# Patient Record
Sex: FEMALE | URBAN - METROPOLITAN AREA
[De-identification: names, ages, dates, MRNs, and addresses within clinical notes are randomized per-mention and may not be internally consistent; named-entity substitution may affect disease eponyms.]

---
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
## 2023-08-20 ENCOUNTER — ATHLETIC TRAINING (OUTPATIENT)
Dept: SPORTS MEDICINE | Facility: OTHER | Age: 18
End: 2023-08-20

## 2023-08-20 DIAGNOSIS — S93.622D LISFRANC'S SPRAIN, LEFT, SUBSEQUENT ENCOUNTER: Primary | ICD-10-CM

## 2023-08-27 ENCOUNTER — ATHLETIC TRAINING (OUTPATIENT)
Dept: SPORTS MEDICINE | Facility: OTHER | Age: 18
End: 2023-08-27

## 2023-08-27 DIAGNOSIS — S76.211A STRAIN OF GROIN, RIGHT, INITIAL ENCOUNTER: Primary | ICD-10-CM

## 2023-08-28 ENCOUNTER — ATHLETIC TRAINING (OUTPATIENT)
Dept: SPORTS MEDICINE | Facility: OTHER | Age: 18
End: 2023-08-28

## 2023-08-28 DIAGNOSIS — S76.211D STRAIN OF GROIN, RIGHT, SUBSEQUENT ENCOUNTER: Primary | ICD-10-CM

## 2023-08-30 ENCOUNTER — ATHLETIC TRAINING (OUTPATIENT)
Dept: SPORTS MEDICINE | Facility: OTHER | Age: 18
End: 2023-08-30

## 2023-08-30 DIAGNOSIS — S76.211D STRAIN OF GROIN, RIGHT, SUBSEQUENT ENCOUNTER: Primary | ICD-10-CM

## 2023-08-30 NOTE — PROGRESS NOTES
Athletic Training Progress Note    Name: Pino Larios  Age: 19 y.o. Assessment/Plan:     Visit Diagnosis: Strain of groin, right, subsequent encounter [N18.837O]    Treatment Plan: Decrease Pain, Strengthen    []  Follow-up PRN. []  Follow-up prior to next practice/game for re-evaluation. [x]  Daily treatment/rehab. Progress note expected weekly. 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 Pt stated their hip last night was very sore from the exercises they completed yesterday. Pt overall appears in good spirits. Pt wants to work on this discomfort before it gets worse. Objective:   See treatment log below    Treatment Log:     Date: 8/28/23 8/27/23   Playing Status: Modified  Modified          Exercise/Treatment       MHP 10min 10min    Fire hydrants  2x10    clamshells 2x10 2x10    normatec  10min    calf stretch 3x20s      assisted (band) hamstring stretch 3x20s      SLR (4way) 2# 3x10      clamshells 3x10      lateral lunges (bilateral) 3x10      Normatec 15min             Pt will schedule for appts this week, to be ready for their first game on Friday.   CY LAT ATC

## 2023-08-30 NOTE — PROGRESS NOTES
Athletic Training Hip/Thigh Evaluation     Name: Cathy Lua  Age: 19 y.o.   School District: [employer]   Sport: Women's Soccer  Date of Assessment: 8/27/2023     Assessment/Plan:      Visit Diagnosis: Strain of groin, right, initial encounter [P95.301I]     Treatment Plan: Decrease Pain, Strengthen    []  Follow-up PRN. []  Follow-up prior to next practice/game for re-evaluation. [x]  Daily treatment/rehab. Progress note expected weekly. Referral:      [x]  Not needed at this time  []  Referred to:      [x]  Coaching staff notified  []  Parent/Guardian Notified      Subjective:     Date of Injury: 8/27/23   Injury occurred during:      []  Practice  [x]  Competition  []  Other:      Mechanism: No clear mechanism, Pt felt discomfort during first scrimmage. Pt states they felt while running. Previous History: Left groin strain from last year     Reported Symptoms:      [x] Pain with rest [] Pressure   [x] Pain with activity [] Burning   [x] Pain with stairs [] Weakness   [x] Sharp pain [] Loss of motion   [] Dull pain [] Clicking   [] Felt pop [] Snapping sensation   [] Felt give way [] Radiating pain   [] Grinding          Objective:    Observation:      []  No observable findings compared bilaterally     [] Swelling [] Genu recurvatum   [] Deformity [] Genu valgum   [] Ecchymosis [] Genu varus   [x] Abnormal gait [] Hip anteversion   [] Atrophy [] Hip retroversion   [x] Muscle spasm [] Patella abnormality   [] Spine curvature          Palpation: Pt is TTP along proximal adductor/groin.      Active Range of Motion:        Full  ROM Limited  ROM Pain  with  ROM No  Motion   Hip Flexion  [x] [] [] []   Hip Extension [x] [] [] []   Hip Abduction [x] [] [x] []   Hip Adduction [x] [] [x] []   Hip Internal Rotation [x] [] [] []   Hip External Rotation [x] [] [] []   Knee Flexion [x] [] [] []   Knee Extension [x] [] [] []      Manual Muscle Tests:      Not performed []                     5 4+ 4 4- 3 or  Under   Hip Flexion  [x] [] [] [] []   Hip Extension [x] [] [] [] []   Hip Abduction [x] [] [] [] []   Hip Adduction [] [x] [] [] []   Hip Internal Rotation [x] [] [] [] []   Hip External Rotation [x] [] [] [] []   Knee Flexion [x] [] [] [] []   Knee Extension [x] [] [] [] []      Special Tests:        (+)  Tightness (+)  Pain (-)  WNL Not  Tested   Fulcrum [] [] [] [x]   Ely’s [] [] [] [x]   Mary Davis [] [x] [] []   Sherwin (Modified Mary Davis) [] [] [] [x]   Nils Casas []  [] [] [x]   Piriformis [] [] [] [x]   KAROL [] [] [] [x]   FADIR [] [] [] [x]   SI Compression/Distraction [] [] [] [x]     (+)  Clicking (+)  Pain (-)  WNL Not  Tested   Hip Scour [] [] [] [x]     (+)  POS   (-)  WNL Not  Tested   Long Sit Test [] Leg  Discrepancy [] [x]   Trendelenberg's  [] Pelvic  Drop [] [x]       Treatment Log:     Date:  8/27/23   Playing Status:  Modified         Exercise/Treatment      MHP 10min    Fire hydrants 2x10    clamshells 2x10    normatec 10min                                              Pt will schedule for appts this week, to be ready for their first game on Friday.   TAVO LAT ATC

## 2023-09-04 NOTE — PROGRESS NOTES
Athletic Training Progress Note    Name: Sharron Price  Age: 19 y.o. Assessment/Plan:     Visit Diagnosis: Strain of groin, right, subsequent encounter [H25.698R]    Treatment Plan: Decrease Pain, Strengthen    []  Follow-up PRN. []  Follow-up prior to next practice/game for re-evaluation. [x]  Daily treatment/rehab. Progress note expected weekly. 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 Pt overall appears in good spirits. Pt stated their hip feels as though it is getting better but they want to cont to strengthen it. Pt has been getting wrapped for practice. Objective:   See treatment log below    Treatment Log:     Date: 8/30/23 8/28/23 8/27/23   Playing Status: Modified Modified  Modified           Exercise/Treatment        MHP 10min 10min 10min    Fire hydrants   2x10   clamshells  2x10 2x10    normatec   10min    calf stretch 3x20s 3x20s      assisted (band) hamstring stretch 3x20s 3x20s      SLR (4way) 3# 3x10 2# 3x10      clamshells 3x10 3x10      lateral lunges (bilateral) 3x10 3x10      Normatec  15min              Pt will schedule for appts this week, to be ready for their first game on Friday.   CY LAT ATC

## 2023-09-14 ENCOUNTER — ATHLETIC TRAINING (OUTPATIENT)
Dept: SPORTS MEDICINE | Facility: OTHER | Age: 18
End: 2023-09-14

## 2023-09-14 DIAGNOSIS — M72.2 PLANTAR FASCIITIS: Primary | ICD-10-CM

## 2023-09-15 NOTE — PROGRESS NOTES
9/14  Pt asked to have feet "scraped" stating that was being treated for plantar fasciitis. Completed IASTM with focus on medial arch.   MANDO ATC

## 2023-10-08 ENCOUNTER — ATHLETIC TRAINING (OUTPATIENT)
Dept: SPORTS MEDICINE | Facility: OTHER | Age: 18
End: 2023-10-08

## 2023-10-08 DIAGNOSIS — M25.562 LEFT KNEE PAIN, UNSPECIFIED CHRONICITY: Primary | ICD-10-CM

## 2023-10-09 ENCOUNTER — ATHLETIC TRAINING (OUTPATIENT)
Dept: SPORTS MEDICINE | Facility: OTHER | Age: 18
End: 2023-10-09

## 2023-10-09 DIAGNOSIS — S93.602A FOOT SPRAIN, LEFT, INITIAL ENCOUNTER: Primary | ICD-10-CM

## 2023-10-09 DIAGNOSIS — M72.2 PLANTAR FASCIITIS: Primary | ICD-10-CM

## 2023-10-09 NOTE — PROGRESS NOTES
Athletic Training Knee Evaluation    Name: Elan Browne  Age: 19 y.o.   School District: [address] [place] [address]   Sport: Women's Soccer  Date of Assessment: 10/8/2023    Assessment/Plan:     Visit Diagnosis: Left knee pain, unspecified chronicity [M25.562]    Treatment Plan: Manage pain and swelling. []  Follow-up PRN. [x]  Follow-up prior to next practice/game for re-evaluation. []  Daily treatment/rehab. Progress note expected weekly. Referral:     [x]  Not needed at this time  []  Referred to:     [x]  Coaching staff notified  []  Parent/Guardian Notified    Subjective:    Date of Injury: 10/7/23    Injury occurred during:     []  Practice  [x]  Competition  []  Other:     Mechanism: Pt does not recall specific mechanism for their knee pain. Pt stated the pain was worse this morning when they woke up.     Previous History: N/A    Reported Symptoms:     [] Felt pop [] Grinding   [] Kaushik  a pop [] Pressure   [] Pain with rest [] Burning   [x] Pain with activity [] Weakness   [x] Pain with stairs [] Loss of motion   [x] Sharp pain [] Clicking   [] Dull pain [] Snapping sensation   [] Radiating pain [] Locking   [] Felt give way       Objective:    Observation:     []  No observable findings compared bilaterally    [x] Swelling [] Genu recurvatum   [] Effusion [] Genu valgum   [] Deformity [] Genu varus   [] Ecchymosis [] Patella nathanael   [] Abnormal gait [] Patella baja   [] Atrophy [] Squinting patellae   [] Muscle spasm [] “Frog eye” patellae     Palpation: TTP along medial joint line    Active Range of Motion:      Full  ROM Limited  ROM Pain  with  ROM No  Motion   Knee Flexion [x] [] [] []   Knee Extension [x] [] [] []   Hip Flexion [x] [] [] []   Hip Extension [x] [] [] []   Hip Abduction [x] [] [] []   Hip Adduction [x] [] [] []   Dorsiflexion [x] [] [] []   Plantarflexion [x] [] [] []     Manual Muscle Tests:     Not performed [x]             5 4+ 4 4- 3 or  Under   Knee Flexion [] [] [] [] []   Knee Extension [] [] [] [] []   Hip Flexion [] [] [] [] []   Hip Extension [] [] [] [] []   Hip Abduction [] [] [] [] []   Hip Adduction [] [] [] [] []   Dorsiflexion [] [] [] [] []   Plantarflexion [] [] [] [] []     Special Tests:      (+)  Laxity (+)  Pain (-)  WNL Not  Tested   Lachman [] [] [x] []   Anterior Drawer [] [] [x] []   Pivot Shift [] [] [x] []   Posterior Drawer [] [] [x] []   Sag [] [] [] [x]   Valgus (0 Degrees) [] [x] [] []   Valgus (30 Degrees) [] [x] [] []   Varus (0 Degrees) [] [] [x] []   Varus (30 Degrees) [] [] [x] []   Irwin [] [] [x] []   Thessally's [] [] [x] []   Apley's [] [] [x] []   Jennyfer's [] [] [x] []   Patellar Apprehension [] [] [x] []   Patellar Glide [] [] [x] []   Ballotable Patella  [] [] [x] []     Treatment Log:     Date: 10/8/23   Playing Status: As Tolerated       Exercise/Treatment    Heel slides 20x   Compression X                                       Pt will follow up prior to tomoroows practice for re-evaluation.   CY LAT ATC

## 2023-10-09 NOTE — PROGRESS NOTES
10/9  Pt requested to have their feet "scraped" for her arches. Pt was counseled on putting her feet into supportive shoes first thing when they step out of bed. Completed IASTM with focus on medial arch. CY LAT ATC    9/14  Pt asked to have feet "scraped" stating that was being treated for plantar fasciitis. Completed IASTM with focus on medial arch.   LB ATC

## 2023-10-10 ENCOUNTER — ATHLETIC TRAINING (OUTPATIENT)
Dept: SPORTS MEDICINE | Facility: OTHER | Age: 18
End: 2023-10-10

## 2023-10-10 ENCOUNTER — TELEPHONE (OUTPATIENT)
Dept: OBGYN CLINIC | Facility: CLINIC | Age: 18
End: 2023-10-10

## 2023-10-10 DIAGNOSIS — M79.672 ACUTE FOOT PAIN, LEFT: ICD-10-CM

## 2023-10-10 DIAGNOSIS — S93.602A FOOT SPRAIN, LEFT, INITIAL ENCOUNTER: Primary | ICD-10-CM

## 2023-10-10 DIAGNOSIS — S99.922A FOOT INJURY, LEFT, INITIAL ENCOUNTER: Primary | ICD-10-CM

## 2023-10-10 NOTE — PROGRESS NOTES
Athletic Training Foot/Ankle Evaluation    Name: Analisa Johnson  Age: 19 y.o. Date of Assessment: 10/9/2023    Assessment/Plan:     Visit Diagnosis: Foot sprain, left, initial encounter [Y44.676P]    Treatment Plan: PWB to maintain movement patterns and manage pain    []  Follow-up PRN. []  Follow-up prior to next practice/game for re-evaluation. [x]  Daily treatment/rehab. Progress note expected weekly. Referral:     []  Not needed at this time  []  Referred to:     [x]  Coaching staff notified  []  Parent/Guardian Notified    Subjective:    Date of Injury: 10/9/23    Injury occurred during:     [x]  Practice  []  Competition  []  Other:     Mechanism: Inversion    Previous History:  Ankle sprains    Reported Symptoms:     [] Felt pop [] Weakness   [x] Cracking or snapping [] Grinding   [] Twisted [x] Sharp pain   [] Pain with rest [] Burning   [] Pain with activity [x] Dull or achy   [] Pain with stairs [] Felt give way   [x] Numbness or tingling [] Loss of motion     Objective:    Observation:     []  No observable findings compared bilaterally    [] Swelling [] Callous or blister   [] Ecchymosis [] Nail abnormality   [] Redness [] Ingrown nail   [] Deformity [] Bunion formation   [] Abnormal gait [] Pes planus   [] Pitting edema [] Pes cavus   [] Open wound [] Atrophy     Palpation: TTP dorsum 1 MT    Active Range of Motion:      Full  ROM Limited  ROM Pain  with  ROM No  Motion   Dorsiflexion [x] [] [x] []   Plantarflexion [x] [] [] []   Inversion [x] [] [x] []   Eversion [x] [] [] []   Great Toe Flexion [] [] [] []   Great Toe Extension [] [] [] []   Toe Flexion [] [] [] []   Toe Extension [] [] [] []     Manual Muscle Tests:     Not performed []             5 4+ 4 4- 3 or  Under   Dorsiflexion [] [] [] [] []   Plantarflexion [] [] [] [] []   Inversion [] [] [] [] []   Eversion [] [] [] [] []   Great Toe Flexion [] [] [] [] []   Great Toe Extension [] [] [] [] []   Toe Flexion [] [] [] [] []   Toe Extension [] [] [] [] []     Special Tests:      (+)  Laxity (+)  Pain (-)  WNL Not  Tested   Bump [] [] [] []   Squeeze [] [] [] []   Percussion [] [] [] []   Tuning Fork [] [] [] []   Anterior Drawer [] [] [] []   Posterior Drawer [] [] [] []   Talar Tilt - Inversion [] [] [] []   Talar Tilt - Eversion [] [] [] []   Kleiger [] [] [] []   Toe Compression [] [] [] []   Toe Distraction [] [] [] []   MTP Valgus [] [] [] []   MTP Varus [] [] [] []   Intermetatarsal Glide [] [] [] []   Tarsometatarsal Glide [] [] [] []   Tinel's [] [] [] []   Impingement Sign [] [] [] []   Seymour's (Achilles) [] [] [] []   Kendall's Sign (DVT) [] [] [] []   Interdigital Neuroma [] [] [] []   Navicular Drop [] [] [] []     Treatment Log:     Date:    Playing Status:        Exercise/Treatment                                                   10/9/23  Crutches and will be reevaluated tomorrow. Able to [facility] for more then three steps.   MANDO ATC

## 2023-10-10 NOTE — TELEPHONE ENCOUNTER
Patient's  Kody Simms) reached out to me in regards to one of his athlete's left foot injury during soccer with clinical concern for Lisfranc injury; per :    Kaushik Guillory, I have a Women's . Anjali Castle. Who got injured last night during practice. I believe her injury is trending towards lis franc pathology and was wondering if you were able to create an order for Xray or if you would need to see her first. I tentatively have her in a walking boot. Thus I will order imaging of her left foot at this time (AP non-weightbearing, AP weightbearing, oblique, lateral).

## 2023-10-10 NOTE — PROGRESS NOTES
Athletic Training Foot/Ankle Evaluation    Name: Gilford Marlin  Age: 19 y.o. Date of Assessment: 10/10/2023    Assessment/Plan:     Visit Diagnosis: Foot sprain, left, initial encounter [W55.975U]    Treatment Plan: PWB to maintain movement patterns and manage pain    []  Follow-up PRN. []  Follow-up prior to next practice/game for re-evaluation. [x]  Daily treatment/rehab. Progress note expected weekly. Referral:     []  Not needed at this time  []  Referred to:     [x]  Coaching staff notified  []  Parent/Guardian Notified    Subjective:    Date of Injury: 10/9/23    Injury occurred during:     [x]  Practice  []  Competition  []  Other:     Mechanism: Inversion    Previous History:  Ankle sprains    Reported Symptoms:     [] Felt pop [] Weakness   [x] Cracking or snapping [] Grinding   [] Twisted [x] Sharp pain   [] Pain with rest [] Burning   [] Pain with activity [x] Dull or achy   [] Pain with stairs [] Felt give way   [x] Numbness or tingling [] Loss of motion     Objective:    Observation:     []  No observable findings compared bilaterally    [] Swelling [] Callous or blister   [] Ecchymosis [] Nail abnormality   [] Redness [] Ingrown nail   [] Deformity [] Bunion formation   [] Abnormal gait [] Pes planus   [] Pitting edema [] Pes cavus   [] Open wound [] Atrophy     Palpation: TTP dorsum 1 MT    Active Range of Motion:      Full  ROM Limited  ROM Pain  with  ROM No  Motion   Dorsiflexion [x] [] [x] []   Plantarflexion [x] [] [] []   Inversion [x] [] [x] []   Eversion [x] [] [] []   Great Toe Flexion [] [] [] []   Great Toe Extension [] [] [] []   Toe Flexion [] [] [] []   Toe Extension [] [] [] []     Manual Muscle Tests:     Not performed []             5 4+ 4 4- 3 or  Under   Dorsiflexion [] [] [] [] []   Plantarflexion [] [] [] [] []   Inversion [] [] [] [] []   Eversion [] [] [] [] []   Great Toe Flexion [] [] [] [] []   Great Toe Extension [] [] [] [] []   Toe Flexion [] [] [] [] []   Toe Extension [] [] [] [] []     Special Tests:      (+)  Laxity (+)  Pain (-)  WNL Not  Tested   Bump [] [] [] []   Squeeze [] [] [] []   Percussion [] [] [] []   Tuning Fork [] [] [] []   Anterior Drawer [] [] [] []   Posterior Drawer [] [] [] []   Talar Tilt - Inversion [] [] [] []   Talar Tilt - Eversion [] [] [] []   Kleiger [] [] [] []   Toe Compression [] [] [] []   Toe Distraction [] [] [] []   MTP Valgus [] [] [] []   MTP Varus [] [] [] []   Intermetatarsal Glide [] [] [] []   Tarsometatarsal Glide [] [] [] []   Tinel's [] [] [] []   Impingement Sign [] [] [] []   Seymour's ([place]) [] [] [] []   Kendall's Sign (DVT) [] [] [] []   Interdigital Neuroma [] [] [] []   Navicular Drop [] [] [] []     Treatment Log:     Date:    Playing Status:        Exercise/Treatment                                                  10/10  Completed cryotherapy and premod 2-6pps 30'. Taped in to dorsiflexion and eversion. Focused on gait training. [facility]     10/9/23  Crutches and will be reevaluated tomorrow. Able to [facility] for more then three steps.   [facility]

## 2023-10-10 NOTE — PROGRESS NOTES
Athletic Training Foot/Ankle Evaluation    Name: Agueda Middleton  Age: 19 y.o. Date of Assessment: 10/10/2023    Assessment/Plan:     Visit Diagnosis: Foot sprain, left, initial encounter [I14.627Q]    Treatment Plan: PWB to maintain movement patterns and manage pain    []  Follow-up PRN. []  Follow-up prior to next practice/game for re-evaluation. [x]  Daily treatment/rehab. Progress note expected weekly. Referral:     []  Not needed at this time  []  Referred to:     [x]  Coaching staff notified  []  Parent/Guardian Notified    Subjective:    Date of Injury: 10/9/23    Injury occurred during:     [x]  Practice  []  Competition  []  Other:     Mechanism: Inversion    Previous History:  Ankle sprains    Reported Symptoms:     [] Felt pop [] Weakness   [x] Cracking or snapping [] Grinding   [] Twisted [x] Sharp pain   [] Pain with rest [] Burning   [] Pain with activity [x] Dull or achy   [] Pain with stairs [] Felt give way   [x] Numbness or tingling [] Loss of motion     Objective:    Observation:     []  No observable findings compared bilaterally    [] Swelling [] Callous or blister   [] Ecchymosis [] Nail abnormality   [] Redness [] Ingrown nail   [] Deformity [] Bunion formation   [] Abnormal gait [] Pes planus   [] Pitting edema [] Pes cavus   [] Open wound [] Atrophy     Palpation: TTP dorsum 1 MT    Active Range of Motion:      Full  ROM Limited  ROM Pain  with  ROM No  Motion   Dorsiflexion [x] [] [x] []   Plantarflexion [x] [] [] []   Inversion [x] [] [x] []   Eversion [x] [] [] []   Great Toe Flexion [] [] [] []   Great Toe Extension [] [] [] []   Toe Flexion [] [] [] []   Toe Extension [] [] [] []     Manual Muscle Tests:     Not performed []             5 4+ 4 4- 3 or  Under   Dorsiflexion [] [] [] [] []   Plantarflexion [] [] [] [] []   Inversion [] [] [] [] []   Eversion [] [] [] [] []   Great Toe Flexion [] [] [] [] []   Great Toe Extension [] [] [] [] []   Toe Flexion [] [] [] [] []   Toe Extension [] [] [] [] []     Special Tests:      (+)  Laxity (+)  Pain (-)  WNL Not  Tested   Bump [] [] [] []   Squeeze [] [] [] []   Percussion [] [] [] []   Tuning Fork [] [] [] []   Anterior Drawer [] [] [] []   Posterior Drawer [] [] [] []   Talar Tilt - Inversion [] [] [] []   Talar Tilt - Eversion [] [] [] []   Kleiger [] [] [] []   Toe Compression [] [] [] []   Toe Distraction [] [] [] []   MTP Valgus [] [] [] []   MTP Varus [] [] [] []   Intermetatarsal Glide [] [] [] []   Tarsometatarsal Glide [] [] [] []   Tinel's [] [] [] []   Impingement Sign [] [] [] []   Seymour's ([place]chilles) [] [] [] []   Kendall's Sign (DVT) [] [] [] []   Interdigital Neuroma [] [] [] []   Navicular Drop [] [] [] []     Treatment Log:     Date:    Playing Status:        Exercise/Treatment                                                  10/10  Pt returned prior to leaving with their team on the bus for their game. Pt stated they have significant discomfort FWB walking with the tape. Pt received pain management e-stim and ice. Pt provided a walking boot. Pt will be referred for X-ray to rule out bony pathology. [facility]    10/10  Completed cryotherapy and premod 2-6pps 30'. Taped in to dorsiflexion and eversion. Focused on gait training. [facility]     10/9/23  Crutches and will be reevaluated tomorrow. Able to [facility] for more then three steps.   [facility]

## 2023-10-27 ENCOUNTER — ATHLETIC TRAINING (OUTPATIENT)
Dept: SPORTS MEDICINE | Facility: OTHER | Age: 18
End: 2023-10-27

## 2023-10-27 DIAGNOSIS — S93.602A FOOT SPRAIN, LEFT, INITIAL ENCOUNTER: Primary | ICD-10-CM

## 2023-10-27 NOTE — PROGRESS NOTES
Athletic Training Progress Note    Name: Danita Bermudez  Age: 19 y.o. Assessment/Plan:     Visit Diagnosis: Foot sprain, left, initial encounter [O94.287Y]    Treatment Plan: Continue boot use as needed, PM especially. []  Follow-up PRN. []  Follow-up prior to next practice/game for re-evaluation. []  Daily treatment/rehab. Progress note expected weekly. Subjective: Bevely Yin reports increasing soreness in the PM especially since discontinuing the walking boot on 11-23.        Objective:   See treatment log below    Treatment Log:     Date: 10/27/23       Playing Status:                Exercise/Treatment        Bike  15 min       Toe Yoga x20       [place] pickups        Balance stable surface

## 2023-10-31 ENCOUNTER — ATHLETIC TRAINING (OUTPATIENT)
Dept: SPORTS MEDICINE | Facility: OTHER | Age: 18
End: 2023-10-31

## 2023-10-31 DIAGNOSIS — S93.602A FOOT SPRAIN, LEFT, INITIAL ENCOUNTER: Primary | ICD-10-CM

## 2023-10-31 NOTE — PROGRESS NOTES
Athletic Training Progress Note    Name: Sharron Price  Age: 19 y.o. Assessment/Plan:     Visit Diagnosis: Foot sprain, left, initial encounter [F83.268U]    Treatment Plan: Cont. Strengthening & pain reduction. []  Follow-up PRN. []  Follow-up prior to next practice/game for re-evaluation. [x]  Daily treatment/rehab. Progress note expected weekly. Subjective: Joseph Sr reports feeling her pain has decreases. Minimal discomfort during ADL.     Objective:   See treatment log below    Treatment Log:     Date:        Playing Status:                Exercise/Treatment        Bike  X 15 min       Toe Yoga X 20       [place] pickups        Balance unstable surface                                                                  Date: 10/27/23       Playing Status:                Exercise/Treatment        Bike  15 min       Toe Yoga x20       [place] pickups        Balance stable surface

## 2023-11-01 ENCOUNTER — ATHLETIC TRAINING (OUTPATIENT)
Dept: SPORTS MEDICINE | Facility: OTHER | Age: 18
End: 2023-11-01

## 2023-11-01 DIAGNOSIS — S93.602A FOOT SPRAIN, LEFT, INITIAL ENCOUNTER: Primary | ICD-10-CM

## 2023-11-01 NOTE — PROGRESS NOTES
Athletic Training Progress Note    Name: Holley Douglas  Age: 19 y.o. Assessment/Plan:     Visit Diagnosis: Foot sprain, left, initial encounter [L05.130A]    Treatment Plan: Continue foot & lower leg strengthening    []  Follow-up PRN. []  Follow-up prior to next practice/game for re-evaluation. [x]  Daily treatment/rehab. Progress note expected weekly. Subjective: Junior Quiros reports no change in discomfort.       Objective:   See treatment log below    Treatment Log:     Date: 11-1-23       Playing Status:                Exercise/Treatment        bike 15  min       Toe Yoga 25       Foam block building 10       Balance unstable surface                                                                  Date:        Playing Status:                Exercise/Treatment        Bike  X 15 min       Toe Yoga X 20       [place] pickups        Balance unstable surface                                                                  Date: 10/27/23       Playing Status:                Exercise/Treatment        Bike  15 min       Toe Yoga x20       [place] pickups        Balance stable surface

## 2023-11-07 ENCOUNTER — ATHLETIC TRAINING (OUTPATIENT)
Dept: SPORTS MEDICINE | Facility: OTHER | Age: 18
End: 2023-11-07

## 2023-11-07 DIAGNOSIS — S93.602A FOOT SPRAIN, LEFT, INITIAL ENCOUNTER: Primary | ICD-10-CM

## 2023-11-07 NOTE — PROGRESS NOTES
Athletic Training Progress Note    Name: Sun Zuleta  Age: 19 y.o. Assessment/Plan:     Visit Diagnosis: Foot sprain, left, initial encounter [M34.655G]    Treatment Plan: Continue foot strengthening and proprioception    []  Follow-up PRN. []  Follow-up prior to next practice/game for re-evaluation. [x]  Daily treatment/rehab. Progress note expected weekly. Subjective: Harish Jin reports continuing pain in the plantar aspect of her foot. She reports cramping intermittently. Objective:   External support of medial longitudinal arch.     See treatment log below    Treatment Log:     Date: 11-7-23       Playing Status:                Exercise/Treatment        Bike  15 min       Self STM        Active release                                                                          Date: 11-1-23       Playing Status:                Exercise/Treatment        bike 15  min       Toe Yoga 25       Foam block building 10       Balance unstable surface                                                                  Date:        Playing Status:                Exercise/Treatment        Bike  X 15 min       Toe Yoga X 20       [place] pickups        Balance unstable surface                                                                  Date: 10/27/23       Playing Status:                Exercise/Treatment        Bike  15 min       Toe Yoga x20       [place] pickups        Balance stable surface

## 2023-11-08 ENCOUNTER — ATHLETIC TRAINING (OUTPATIENT)
Dept: SPORTS MEDICINE | Facility: OTHER | Age: 18
End: 2023-11-08

## 2023-11-08 DIAGNOSIS — S93.602A FOOT SPRAIN, LEFT, INITIAL ENCOUNTER: Primary | ICD-10-CM

## 2023-11-08 NOTE — PROGRESS NOTES
Athletic Training Progress Note    Name: Corky Adair  Age: 19 y.o. Assessment/Plan:     Visit Diagnosis: No primary diagnosis found. Treatment Plan: Continue foot/ankle strengthening. Continue medial longitudinal arch support. []  Follow-up PRN. []  Follow-up prior to next practice/game for re-evaluation. [x]  Daily treatment/rehab. Progress note expected weekly. Subjective: Low-dye arch taping relieved all foot discomfort.     Objective:   See treatment log below    Treatment Log:     Date:        Playing Status:                Exercise/Treatment        Low-dye arch tape                                                                                          Date: 11-7-23       Playing Status:                Exercise/Treatment        Bike  15 min       Self STM        Active release                                                                          Date: 11-1-23       Playing Status:                Exercise/Treatment        bike 15  min       Toe Yoga 25       Foam block building 10       Balance unstable surface                                                                  Date:        Playing Status:                Exercise/Treatment        Bike  X 15 min       Toe Yoga X 20       [place] pickups        Balance unstable surface                                                                  Date: 10/27/23       Playing Status:                Exercise/Treatment        Bike  15 min       Toe Yoga x20       [place] pickups        Balance stable surface

## 2023-11-13 ENCOUNTER — ATHLETIC TRAINING (OUTPATIENT)
Dept: SPORTS MEDICINE | Facility: OTHER | Age: 18
End: 2023-11-13

## 2023-11-13 DIAGNOSIS — S93.622D LISFRANC'S SPRAIN, LEFT, SUBSEQUENT ENCOUNTER: Primary | ICD-10-CM

## 2023-11-13 NOTE — PROGRESS NOTES
Elder Single presents today with increased soreness after not having her L medial long. Arch supported for the last 2 days. She reports no pain with support. [place] pick-ups  Toe Yoga  IASTM  Low-dye taping    Will progress to more permanent medial longitudinal arch support.

## 2023-11-15 ENCOUNTER — ATHLETIC TRAINING (OUTPATIENT)
Dept: SPORTS MEDICINE | Facility: OTHER | Age: 18
End: 2023-11-15

## 2023-11-15 DIAGNOSIS — S93.622D LISFRANC'S SPRAIN, LEFT, SUBSEQUENT ENCOUNTER: Primary | ICD-10-CM

## 2023-11-15 NOTE — PROGRESS NOTES
Athletic Training Progress Note    Name: Chris Chou  Age: 19 y.o. Assessment/Plan:     Visit Diagnosis: Lisfranc's sprain, left, subsequent encounter [M23.449Q]    Treatment Plan: Continue foot/ankle strengthening & proprioceptive control. []  Follow-up PRN. []  Follow-up prior to next practice/game for re-evaluation. [x]  Daily treatment/rehab. Progress note expected weekly. Subjective: Elder Single reports feeling less/no pain when supported. She has intermittent pain when she mis-steps while walking.     Objective:   See treatment log below    Treatment Log:     Date: 11/15/23       Playing Status:                Exercise/Treatment        HP 10 min       [place] pickups        Balance work        airex        Toe yoga                                                          Date:        Playing Status:                Exercise/Treatment        Low-dye arch tape                                                                                          Date: 11-7-23       Playing Status:                Exercise/Treatment        Bike  15 min       Self STM        Active release                                                                          Date: 11-1-23       Playing Status:                Exercise/Treatment        bike 15  min       Toe Yoga 25       Foam block building 10       Balance unstable surface                                                                  Date:        Playing Status:                Exercise/Treatment        Bike  X 15 min       Toe Yoga X 20       [place] pickups        Balance unstable surface                                                                  Date: 10/27/23       Playing Status:                Exercise/Treatment        Bike  15 min       Toe Yoga x20       [place] pickups        Balance stable surface

## 2023-11-19 ENCOUNTER — ATHLETIC TRAINING (OUTPATIENT)
Dept: SPORTS MEDICINE | Facility: OTHER | Age: 18
End: 2023-11-19

## 2023-11-19 DIAGNOSIS — S93.622D LISFRANC'S SPRAIN, LEFT, SUBSEQUENT ENCOUNTER: Primary | ICD-10-CM

## 2023-11-21 NOTE — PROGRESS NOTES
Charissa Keithsherley reports decreasing pain in the arch of her foot. We will continue supporting her medial longitudinal arch.     [place] pick ups  Toe Yoga  Foot stretching

## 2023-11-29 ENCOUNTER — ATHLETIC TRAINING (OUTPATIENT)
Dept: SPORTS MEDICINE | Facility: OTHER | Age: 18
End: 2023-11-29

## 2023-11-29 DIAGNOSIS — S93.622D LISFRANC'S SPRAIN, LEFT, SUBSEQUENT ENCOUNTER: Primary | ICD-10-CM

## 2023-11-29 NOTE — PROGRESS NOTES
Athletic Training Progress Note    Name: Gilford Marlin  Age: 19 y.o. Assessment/Plan:     Visit Diagnosis: Lisfranc's sprain, left, subsequent encounter [X91.280O]    Treatment Plan: Continue strengthening and support of medial long arch    []  Follow-up PRN. []  Follow-up prior to next practice/game for re-evaluation. [x]  Daily treatment/rehab. Progress note expected weekly. Subjective: Cassius Landers reports feeling better with support. She is more coaching and is more active.      Objective:   See treatment log below    Treatment Log:     Date: 11/29/23       Playing Status:                Exercise/Treatment        [place] pick ups 1 can       Toe yoga 50       Foot mobs        IASTM                                                                  Date: 11/15/23       Playing Status:                Exercise/Treatment        HP 10 min       [place] pickups        Balance work        airex        Toe yoga                                                          Date:        Playing Status:                Exercise/Treatment        Low-dye arch tape                                                                                          Date: 11-7-23       Playing Status:                Exercise/Treatment        Bike  15 min       Self STM        Active release                                                                          Date: 11-1-23       Playing Status:                Exercise/Treatment        bike 15  min       Toe Yoga 25       Foam block building 10       Balance unstable surface                                                                  Date:        Playing Status:                Exercise/Treatment        Bike  X 15 min       Toe Yoga X 20       [place] pickups        Balance unstable surface                                                                  Date: 10/27/23       Playing Status:                Exercise/Treatment        Bike  15 min       Toe Yoga x20       [place] pickups Balance stable surface

## 2023-12-01 ENCOUNTER — ATHLETIC TRAINING (OUTPATIENT)
Dept: SPORTS MEDICINE | Facility: OTHER | Age: 18
End: 2023-12-01

## 2023-12-01 DIAGNOSIS — S93.622D LISFRANC'S SPRAIN, LEFT, SUBSEQUENT ENCOUNTER: Primary | ICD-10-CM

## 2023-12-01 NOTE — PROGRESS NOTES
Athletic Training Progress Note    Name: Sharron Price  Age: 19 y.o. Assessment/Plan:     Visit Diagnosis: Lisfranc's sprain, left, subsequent encounter [W85.496S]    Treatment Plan: Continue support of medial longitudinal arch    []  Follow-up PRN. []  Follow-up prior to next practice/game for re-evaluation. [x]  Daily treatment/rehab. Progress note expected weekly.      Subjective: Joseph Sr reports no pain or discomfort with proper support    Objective:   See treatment log below    Treatment Log:     Date: 12/1/23       Playing Status:                Exercise/Treatment        Airex balance [id number]                                                                                         Date: 11/29/23       Playing Status:                Exercise/Treatment        [place] pick ups 1 can       Toe yoga 50       Foot mobs        IASTM                                                                  Date: 11/15/23       Playing Status:                Exercise/Treatment        HP 10 min       [place] pickups        Balance work        airex        Toe yoga                                                          Date:        Playing Status:                Exercise/Treatment        Low-dye arch tape                                                                                          Date: 11-7-23       Playing Status:                Exercise/Treatment        Bike  15 min       Self STM        Active release                                                                          Date: 11-1-23       Playing Status:                Exercise/Treatment        bike 15  min       Toe Yoga 25       Foam block building 10       Balance unstable surface                                                                  Date:        Playing Status:                Exercise/Treatment        Bike  X 15 min       Toe Yoga X 20       [place] pickups        Balance unstable surface Date: 10/27/23       Playing Status:                Exercise/Treatment        Bike  15 min       Toe Yoga x20       [place] pickups        Balance stable surface

## 2023-12-07 ENCOUNTER — ATHLETIC TRAINING (OUTPATIENT)
Dept: SPORTS MEDICINE | Facility: OTHER | Age: 18
End: 2023-12-07

## 2023-12-07 DIAGNOSIS — S93.622D LISFRANC'S SPRAIN, LEFT, SUBSEQUENT ENCOUNTER: Primary | ICD-10-CM

## 2023-12-07 NOTE — PROGRESS NOTES
Athletic Training Progress Note    Name: Elan Yang  Age: 19 y.o. Assessment/Plan:     Visit Diagnosis: Lisfranc's sprain, left, subsequent encounter [P59.208G]    Treatment Plan: Continue strengthening, proprioception & support. []  Follow-up PRN. []  Follow-up prior to next practice/game for re-evaluation. [x]  Daily treatment/rehab. Progress note expected weekly. Subjective: Tamia Benites reports less pain and discomfort during ADL & light activity.     Objective:   See treatment log below    Treatment Log:     Date: 12/7/23       Playing Status:                Exercise/Treatment        Airex balance ball catch 3x10       Airex 90deg rotation hops 3c10       Toe yoga 25       Self massage                                                                  Date: 12/1/23       Playing Status:                Exercise/Treatment        Airex balance [id number]                                                                                         Date: 11/29/23       Playing Status:                Exercise/Treatment        [place] pick ups 1 can       Toe yoga 50       Foot mobs        IASTM                                                                  Date: 11/15/23       Playing Status:                Exercise/Treatment        HP 10 min       [place] pickups        Balance work        airex        Toe yoga                                                          Date:        Playing Status:                Exercise/Treatment        Low-dye arch tape                                                                                          Date: 11-7-23       Playing Status:                Exercise/Treatment        Bike  15 min       Self STM        Active release                                                                          Date: 11-1-23       Playing Status:                Exercise/Treatment        bike 15  min       Toe Yoga 25       Foam block building 10       Balance unstable surface Date:        Playing Status:                Exercise/Treatment        Bike  X 15 min       Toe Yoga X 20       [place] pickups        Balance unstable surface                                                                  Date: 10/27/23       Playing Status:                Exercise/Treatment        Bike  15 min       Toe Yoga x20       [place] pickups        Balance stable surface

## 2024-01-19 ENCOUNTER — ATHLETIC TRAINING (OUTPATIENT)
Dept: SPORTS MEDICINE | Facility: OTHER | Age: 19
End: 2024-01-19

## 2024-01-19 DIAGNOSIS — S93.622S LISFRANC'S SPRAIN, LEFT, SEQUELA: Primary | ICD-10-CM

## 2024-01-19 NOTE — PROGRESS NOTES
Athletic Training Progress Note    Name: Joceline Mueller  Age: 18 y.o.     Assessment/Plan:     Visit Diagnosis: Lisfranc sprain    Treatment Plan: Continue strengthening, proprioception & support.    []  Follow-up PRN.   []  Follow-up prior to next practice/game for re-evaluation.  [x]  Daily treatment/rehab. Progress note expected weekly.     Subjective: Ath states she no longer has pain with her foot. She states it does get sore after activity.     Objective:   See treatment log below    Treatment Log:     Date: 1/19/24 12/7/23   Playing Status:          Exercise/Treatment     Airex balance ball catch  3x10   Airex 90deg rotation hops  3c10   Toe yoga  25   Self massage     IASTM Completed            Ath will attempt a long run today and f/u with AT.

## 2024-01-22 ENCOUNTER — ATHLETIC TRAINING (OUTPATIENT)
Dept: SPORTS MEDICINE | Facility: OTHER | Age: 19
End: 2024-01-22

## 2024-01-22 DIAGNOSIS — S93.622S LISFRANC'S SPRAIN, LEFT, SEQUELA: Primary | ICD-10-CM

## 2024-01-22 NOTE — PROGRESS NOTES
1/22/24  Ath completed stretching on her own, hip mobility, and IASTM was completed on her left foot.    [facility]

## 2024-01-26 ENCOUNTER — ATHLETIC TRAINING (OUTPATIENT)
Dept: SPORTS MEDICINE | Facility: OTHER | Age: 19
End: 2024-01-26

## 2024-01-26 DIAGNOSIS — S93.622S LISFRANC'S SPRAIN, LEFT, SEQUELA: Primary | ICD-10-CM

## 2024-01-26 NOTE — PROGRESS NOTES
Athletic Training Progress Note    Name: Joceline Mueller  Age: 18 y.o.     Assessment/Plan:     Visit Diagnosis: Lisfranc sprain    Treatment Plan: Continue strengthening, proprioception & support.    []  Follow-up PRN.   []  Follow-up prior to next practice/game for re-evaluation.  [x]  Daily treatment/rehab. Progress note expected weekly.     Subjective: Ath states she no longer has pain with her foot. She states it does get sore after activity. Pain with cutting and short sprints. Athlete said there was pain and soreness on the bottom of the foot during short distance conditioning drills.     Objective:   See treatment log below    Treatment Log:     Date: 1/26/24 1/19/24 12/7/23   Playing Status: As tolerated As tolerated As tolerated         Exercise/Treatment      Airex balance ball catch   3x10   Airex 90deg rotation hops   3x10   Toe yoga [place] pickups, towel scrunches   25   Self massage      IASTM Completed  Completed     Stretching (quads, piriformis) 2x40s

## 2024-01-29 ENCOUNTER — ATHLETIC TRAINING (OUTPATIENT)
Dept: SPORTS MEDICINE | Facility: OTHER | Age: 19
End: 2024-01-29

## 2024-01-29 DIAGNOSIS — S93.622S LISFRANC'S SPRAIN, LEFT, SEQUELA: Primary | ICD-10-CM

## 2024-01-29 NOTE — PROGRESS NOTES
Athletic Training Progress Note    Name: Joceline Mueller  Age: 18 y.o.     Assessment/Plan:     Visit Diagnosis: Lisfranc sprain    Treatment Plan: Continue strengthening, proprioception & support.    []  Follow-up PRN.   []  Follow-up prior to next practice/game for re-evaluation.  [x]  Daily treatment/rehab. Progress note expected weekly.     Subjective: Ath is feeling very sore from practice. She states she stepped wrong at practice and has a little pain on the top of her foot. Her medial longitudinal arch has been very painful.     Objective:   No TTP     Treatment Log:     Date: 1/29/24 1/26/24 1/19/24 12/7/23   Playing Status: As tolerated As tolerated As tolerated As tolerated          Exercise/Treatment       Airex balance ball catch    3x10   Airex 90deg rotation hops    3x10   Toe yoga  [place] pickups, towel scrunches   25   Self massage       IASTM Completed  Completed  Completed     Stretching (quads, piriformis) 2x40s  2x40s       Ath will not practice 1/29/24.

## 2024-01-30 ENCOUNTER — ATHLETIC TRAINING (OUTPATIENT)
Dept: SPORTS MEDICINE | Facility: OTHER | Age: 19
End: 2024-01-30

## 2024-01-30 DIAGNOSIS — S93.622S LISFRANC'S SPRAIN, LEFT, SEQUELA: Primary | ICD-10-CM

## 2024-01-30 NOTE — PROGRESS NOTES
Athletic Training Progress Note    Name: Joceline Mueller  Age: 18 y.o.     Assessment/Plan:     Visit Diagnosis: Lisfranc sprain    Treatment Plan: Continue strengthening, proprioception & support.    []  Follow-up PRN.   []  Follow-up prior to next practice/game for re-evaluation.  [x]  Daily treatment/rehab. Progress note expected weekly.     Subjective: Ath is feeling very sore from practice. She states she stepped wrong at practice and has a little pain on the top of her foot. Her medial longitudinal arch has been very painful.     Objective:   No TTP     Treatment Log:     Date: 1/29/24 1/26/24 1/19/24 12/7/23   Playing Status: As tolerated As tolerated As tolerated As tolerated          Exercise/Treatment       Airex balance ball catch    3x10   Airex 90deg rotation hops    3x10   Toe yoga  [place] pickups, towel scrunches   25   Self massage       IASTM Completed  Completed  Completed     Stretching (quads, piriformis) 2x40s  2x40s       Ath will not practice 1/29/24.    1/30  Completed foot exercises after IASTM. Toe up and down, grab the ground, and isometric holds.  LB ATC

## 2024-01-31 ENCOUNTER — ATHLETIC TRAINING (OUTPATIENT)
Dept: SPORTS MEDICINE | Facility: OTHER | Age: 19
End: 2024-01-31

## 2024-01-31 DIAGNOSIS — S93.622S LISFRANC'S SPRAIN, LEFT, SEQUELA: Primary | ICD-10-CM

## 2024-01-31 NOTE — PROGRESS NOTES
Athletic Training Progress Note    Name: Joceline Mueller  Age: 18 y.o.     Assessment/Plan:     Visit Diagnosis: Lisfranc sprain    Treatment Plan: Continue strengthening, proprioception & support.    []  Follow-up PRN.   []  Follow-up prior to next practice/game for re-evaluation.  [x]  Daily treatment/rehab. Progress note expected weekly.     Subjective: Ath is feeling very sore from practice. She states she stepped wrong at practice and has a little pain on the top of her foot. Her medial longitudinal arch has been very painful.     Objective:   No TTP     Treatment Log:     Date: 1/29/24 1/26/24 1/19/24 12/7/23   Playing Status: As tolerated As tolerated As tolerated As tolerated          Exercise/Treatment       Airex balance ball catch    3x10   Airex 90deg rotation hops    3x10   Toe yoga  [place] pickups, towel scrunches   25   Self massage       IASTM Completed  Completed  Completed     Stretching (quads, piriformis) 2x40s  2x40s       Ath will not practice 1/29/24.    1/30  Completed foot exercises after IASTM. Toe up and down, grab the ground, and isometric holds.  LB ATC     1/31  Completed foot exercises then cardio non running cradle, stool sprint, and side plank spacer.  LB ATC

## 2024-02-01 ENCOUNTER — ATHLETIC TRAINING (OUTPATIENT)
Dept: SPORTS MEDICINE | Facility: OTHER | Age: 19
End: 2024-02-01

## 2024-02-01 DIAGNOSIS — S93.622S LISFRANC'S SPRAIN, LEFT, SEQUELA: Primary | ICD-10-CM

## 2024-02-02 ENCOUNTER — ATHLETIC TRAINING (OUTPATIENT)
Dept: SPORTS MEDICINE | Facility: OTHER | Age: 19
End: 2024-02-02

## 2024-02-02 DIAGNOSIS — S93.622S LISFRANC'S SPRAIN, LEFT, SEQUELA: Primary | ICD-10-CM

## 2024-02-02 NOTE — PROGRESS NOTES
2/2/24  Completed exercises and added running progression. She will attempt to practice more running but no reaction movements.  LB ATC    2/1/24  Joceline reports no change in her pain.  She performed toe yoga, airex balance, stretching & heat.  She will continue to not run until she is more pain free and exhibits increased foot strength.    LASHANDA

## 2024-02-02 NOTE — PROGRESS NOTES
Joceline reports no change in her pain.  She performed toe yoga, airex balance, stretching & heat.  She will continue to not run until she is more pain free and exhibits increased foot strength.    LASHANDA

## 2024-02-06 ENCOUNTER — ATHLETIC TRAINING (OUTPATIENT)
Dept: SPORTS MEDICINE | Facility: OTHER | Age: 19
End: 2024-02-06

## 2024-02-06 DIAGNOSIS — S93.622S LISFRANC'S SPRAIN, LEFT, SEQUELA: Primary | ICD-10-CM

## 2024-02-06 NOTE — PROGRESS NOTES
2/6  Completed exercises progression for grabbing the ground. Completed sprinting drills. Will focus on 30 minutes of live practice with breaking down.  MANDO ATC    2/2/24  Completed exercises and added running progression. She will attempt to practice more running but no reaction movements.  MANDO [facility]    2/1/24  Joceline reports no change in her pain.  She performed toe yoga, airex balance, stretching & heat.  She will continue to not run until she is more pain free and exhibits increased foot strength.    LASHANDA

## 2024-02-21 ENCOUNTER — ATHLETIC TRAINING (OUTPATIENT)
Dept: SPORTS MEDICINE | Facility: OTHER | Age: 19
End: 2024-02-21

## 2024-02-21 DIAGNOSIS — S93.622S LISFRANC'S SPRAIN, LEFT, SEQUELA: Primary | ICD-10-CM

## 2024-02-21 NOTE — PROGRESS NOTES
2/21  Completed functional workout stepups, D drill, wall ball, reaction ball, knee to ground squats, and nordic leans.  [facility]    2/6  Completed exercises progression for grabbing the ground. Completed sprinting drills. Will focus on 30 minutes of live practice with breaking down.  [facility]    2/2/24  Completed exercises and added running progression. She will attempt to practice more running but no reaction movements.  [facility]    2/1/24  Joceline reports no change in her pain.  She performed toe yoga, airex balance, stretching & heat.  She will continue to not run until she is more pain free and exhibits increased foot strength.    LASHANDA

## 2024-02-23 ENCOUNTER — ATHLETIC TRAINING (OUTPATIENT)
Dept: SPORTS MEDICINE | Facility: OTHER | Age: 19
End: 2024-02-23

## 2024-02-23 DIAGNOSIS — S93.622S LISFRANC'S SPRAIN, LEFT, SEQUELA: Primary | ICD-10-CM

## 2024-02-24 NOTE — PROGRESS NOTES
2/23:  Completed IASTM for plantar fascia pain.  CM    2/21  Completed functional workout stepups, D drill, wall ball, reaction ball, knee to ground squats, and nordic leans.  [facility]     2/6  Completed exercises progression for grabbing the ground. Completed sprinting drills. Will focus on 30 minutes of live practice with breaking down.  [facility]     2/2/24  Completed exercises and added running progression. She will attempt to practice more running but no reaction movements.  [facility]     2/1/24  Joceline reports no change in her pain.  She performed toe yoga, airex balance, stretching & heat.  She will continue to not run until she is more pain free and exhibits increased foot strength.     LASHANDA

## 2024-02-28 ENCOUNTER — ATHLETIC TRAINING (OUTPATIENT)
Dept: SPORTS MEDICINE | Facility: OTHER | Age: 19
End: 2024-02-28

## 2024-02-28 DIAGNOSIS — S93.622S LISFRANC'S SPRAIN, LEFT, SEQUELA: Primary | ICD-10-CM

## 2024-02-28 NOTE — PROGRESS NOTES
Joceline reports general L foot soreness after playing a full game yesterday.  HP x 10 min, jt mobs, iastm.  Tolerated well.    LASHANDA

## 2024-03-13 ENCOUNTER — ATHLETIC TRAINING (OUTPATIENT)
Dept: SPORTS MEDICINE | Facility: OTHER | Age: 19
End: 2024-03-13

## 2024-03-13 DIAGNOSIS — S93.622S LISFRANC'S SPRAIN, LEFT, SEQUELA: Primary | ICD-10-CM

## 2024-03-14 NOTE — PROGRESS NOTES
3/13  Pt states that her foot is sore and painful from walking during her trip to [place]. Completed exercises for ground grabbing. Pt felt that her foot was hurting from scrapping treatment on 3/12.  LB ATC    2/28  Joceline reports general L foot soreness after playing a full game yesterday.  HP x 10 min, joset ang lebron.  Tolerated well.    MW

## 2024-03-18 ENCOUNTER — ATHLETIC TRAINING (OUTPATIENT)
Dept: SPORTS MEDICINE | Facility: OTHER | Age: 19
End: 2024-03-18

## 2024-03-18 DIAGNOSIS — S93.622S LISFRANC'S SPRAIN, LEFT, SEQUELA: Primary | ICD-10-CM

## 2024-03-19 ENCOUNTER — ATHLETIC TRAINING (OUTPATIENT)
Dept: SPORTS MEDICINE | Facility: OTHER | Age: 19
End: 2024-03-19

## 2024-03-19 DIAGNOSIS — S13.4XXA WHIPLASH INJURIES, INITIAL ENCOUNTER: Primary | ICD-10-CM

## 2024-03-20 ENCOUNTER — ATHLETIC TRAINING (OUTPATIENT)
Dept: SPORTS MEDICINE | Facility: OTHER | Age: 19
End: 2024-03-20

## 2024-03-20 DIAGNOSIS — S13.4XXA WHIPLASH INJURIES, INITIAL ENCOUNTER: Primary | ICD-10-CM

## 2024-03-20 NOTE — PROGRESS NOTES
3/18  Completed STM of arches of feet and then found over activation in TA.  Treated and provided techniques to assess and treat herself.  LB ATC    3/13  Pt states that her foot is sore and painful from walking during her trip to [place]. Completed exercises for ground grabbing. Pt felt that her foot was hurting from scrapping treatment on 3/12.  LB ATC    2/28  Joceline reports general L foot soreness after playing a full game yesterday.  HP x 10 min, jt mobs, iastm.  Tolerated well.    MW

## 2024-03-20 NOTE — PROGRESS NOTES
"3/19:  S: Pt states that they collided with another teammate during practice this evening while attempting to go for a ground ball. She states that her biggest complaint is the neck pain that she is experiencing. She has had a previous concussion 2 years ago from soccer. She states that she feels as if she is going to cry and feels and seems emotional.   O: Pt was given a symptom check list which showed some concussion like symptoms, bu she states that she was dealing with a majority of them before she was hit. Those symptoms include anxious, nervous, headache, feeling emotional. She states that she has been dealing with personal \"things\". She explains more on her symptom check list. She states that she does not have her usual symptoms of a concussion which included sensitivity to light and noise and an extreme headache.   We completed VOMS and balance exercises which did not make her symptoms increase.  A: Whiplash, neck extensor strain  P: AT made pt another appointment in the morning to be re-evaluated and to monitor to see if her symptoms raise overnight. She was given the take home concussion hand out and made aware of red flags.  CM   "

## 2024-03-21 NOTE — PROGRESS NOTES
Joceline reports today with a stiff neck and upper trap.  She has no c/o headache or other concussion symptoms.  Neck ROM wnl w/pain in flex/rotation/lateral flex.    HP x 10 min  Cervical mobs  Gentle stretch.    Tolerated well.

## 2024-03-27 ENCOUNTER — ATHLETIC TRAINING (OUTPATIENT)
Dept: SPORTS MEDICINE | Facility: OTHER | Age: 19
End: 2024-03-27

## 2024-03-27 DIAGNOSIS — S93.622S LISFRANC'S SPRAIN, LEFT, SEQUELA: Primary | ICD-10-CM

## 2024-03-27 NOTE — PROGRESS NOTES
3/27  Discussed taping techniques and went over plan for game this evening.  Completed exercises for strengthening.   ATC    3/18  Completed STM of arches of feet and then found over activation in TA.  Treated and provided techniques to assess and treat herself.   ATC    3/13  Pt states that her foot is sore and painful from walking during her trip to [place]. Completed exercises for ground grabbing. Pt felt that her foot was hurting from scrapping treatment on 3/12.   ATC    2/28  Joceline reports general L foot soreness after playing a full game yesterday.  HP x 10 min, jt mobs, iastm.  Tolerated well.    MW

## 2024-04-03 ENCOUNTER — ATHLETIC TRAINING (OUTPATIENT)
Dept: SPORTS MEDICINE | Facility: OTHER | Age: 19
End: 2024-04-03

## 2024-04-03 DIAGNOSIS — S93.622S LISFRANC'S SPRAIN, LEFT, SEQUELA: Primary | ICD-10-CM

## 2024-04-03 NOTE — PROGRESS NOTES
4/3  Completed body reset and release of zygomatic arch and hips. IASTM for both feet. Will tape with leukotape on Thursdays.   ATC    3/27  Discussed taping techniques and went over plan for game this evening.  Completed exercises for strengthening.   ATC    3/18  Completed STM of arches of feet and then found over activation in TA.  Treated and provided techniques to assess and treat herself.   ATC    3/13  Pt states that her foot is sore and painful from walking during her trip to [place]. Completed exercises for ground grabbing. Pt felt that her foot was hurting from scrapping treatment on 3/12.   ATC    2/28  Joceline reports general L foot soreness after playing a full game yesterday.  HP x 10 min, jt mobs, iastm.  Tolerated well.    MW

## 2024-04-19 ENCOUNTER — ATHLETIC TRAINING (OUTPATIENT)
Dept: SPORTS MEDICINE | Facility: OTHER | Age: 19
End: 2024-04-19

## 2024-04-19 DIAGNOSIS — S93.622S LISFRANC'S SPRAIN, LEFT, SEQUELA: Primary | ICD-10-CM

## 2024-04-19 NOTE — PROGRESS NOTES
4/19  Completed body reset and then foot STW.   ATC    4/3  Completed body reset and release of zygomatic arch and hips. IASTM for both feet. Will tape with leukotape on Thursdays.   ATC    3/27  Discussed taping techniques and went over plan for game this evening.  Completed exercises for strengthening.   ATC    3/18  Completed STM of arches of feet and then found over activation in TA.  Treated and provided techniques to assess and treat herself.   ATC    3/13  Pt states that her foot is sore and painful from walking during her trip to [place]. Completed exercises for ground grabbing. Pt felt that her foot was hurting from scrapping treatment on 3/12.   ATC    2/28  Joceline reports general L foot soreness after playing a full game yesterday.  HP x 10 min, joset vi, iastm.  Tolerated well.    MW

## 2024-04-23 ENCOUNTER — ATHLETIC TRAINING (OUTPATIENT)
Dept: SPORTS MEDICINE | Facility: OTHER | Age: 19
End: 2024-04-23

## 2024-04-23 DIAGNOSIS — S93.622S LISFRANC'S SPRAIN, LEFT, SEQUELA: Primary | ICD-10-CM

## 2024-04-23 NOTE — PROGRESS NOTES
4/23  Complete AAT.   ATC    4/19  Completed body reset and then foot STW.   ATC    4/3  Completed body reset and release of zygomatic arch and hips. IASTM for both feet. Will tape with leukotape on Thursdays.   ATC    3/27  Discussed taping techniques and went over plan for game this evening.  Completed exercises for strengthening.   ATC    3/18  Completed STM of arches of feet and then found over activation in TA.  Treated and provided techniques to assess and treat herself.   ATC    3/13  Pt states that her foot is sore and painful from walking during her trip to [place]. Completed exercises for ground grabbing. Pt felt that her foot was hurting from scrapping treatment on 3/12.   ATC    2/28  Joceline reports general L foot soreness after playing a full game yesterday.  HP x 10 min, jt mobs, iastm.  Tolerated well.    MW

## 2024-04-26 ENCOUNTER — ATHLETIC TRAINING (OUTPATIENT)
Dept: SPORTS MEDICINE | Facility: OTHER | Age: 19
End: 2024-04-26

## 2024-04-26 DIAGNOSIS — S93.622S LISFRANC'S SPRAIN, LEFT, SEQUELA: Primary | ICD-10-CM

## 2024-04-26 NOTE — PROGRESS NOTES
Joceline reports with tightness in the plantar aspect of her L foot.  She reports foot cramping.    Thermex heat x 10  Toe yoga  IASTM to plantar aspect of foot  Taped with navicular sling    Tolerated well    MW

## 2024-08-17 ENCOUNTER — ATHLETIC TRAINING (OUTPATIENT)
Dept: SPORTS MEDICINE | Facility: OTHER | Age: 19
End: 2024-08-17

## 2024-08-17 DIAGNOSIS — S93.622S LISFRANC'S SPRAIN, LEFT, SEQUELA: Primary | ICD-10-CM

## 2024-08-17 NOTE — PROGRESS NOTES
Athletic Training Progress Note    Name: Joceline Mueller  Age: 19 y.o.     Assessment/Plan:     Visit Diagnosis: Lisfranc's sprain, left, sequela [S93.724J]    Treatment Plan: Manage discomfort, Tape for practice/ games    [x]  Follow-up PRN.   []  Follow-up prior to next practice/game for re-evaluation.  []  Daily treatment/rehab. Progress note expected weekly.     Subjective: Joceline reported to the sports medicine clinic today to restart treatment on her left arch. Joceline has past medical history of a Lisfranc sprain fall 2023.  She reported mild discomfort during this morning's practice without tape.  She requested that she could be taped for practices and games moving forward and is wanting to begin treatment again.  She does not have much time prior to second session today, so she requested pain management.    Objective:   See treatment log below    Treatment Log:     Date: 8/17/24   Playing Status: As tolerated       Exercise/Treatment    Ice Cup x   Tape x                                       Joceline will follow-up as needed for treatment and rehab for her left foot and will be taped for practice and games moving forward.

## 2024-08-17 NOTE — PROGRESS NOTES
8/17  Ath comes to [facility] after practice. She is frustrated that her foot is bothering her already. AT explained that it is a lot on her body after not doing this much over summer and that she is now working out on a different surface than she was at home. She is also frustrated with a recent health condition she was diagnosed with and feels she is not physically where she used to be. Her health condition was discussed with AT and ath felt better afterwards. She will keep AT updated.     Treatment:   Thermx 20min     Continue lis franc tape for practice.

## 2024-08-24 ENCOUNTER — ATHLETIC TRAINING (OUTPATIENT)
Dept: SPORTS MEDICINE | Facility: OTHER | Age: 19
End: 2024-08-24

## 2024-08-24 DIAGNOSIS — S33.8XXA: Primary | ICD-10-CM

## 2024-08-24 NOTE — PROGRESS NOTES
"8/23/24  Ath comes in c/o right sacrum pain. She states she tripped over another player's foot during practice and felt her right buttock tighten up. She states it feels like a \"pinch\" sensation. Applying pressure to the area relieves her pain.  Ath completed: piriformis stretching, side lying AB with ER, glute bridges    She will f/u prn.   "

## 2024-08-26 ENCOUNTER — ATHLETIC TRAINING (OUTPATIENT)
Dept: SPORTS MEDICINE | Facility: OTHER | Age: 19
End: 2024-08-26

## 2024-08-26 DIAGNOSIS — S33.8XXD: Primary | ICD-10-CM

## 2024-08-26 NOTE — PROGRESS NOTES
"8/26/24  Ath states her right sacrum pain has decreased. She still feels it but it is not as prominent. She also c/o right groin pain that she mostly feels with running.    Slight TTP adductor  Adductor MMT 5/5 slight discomfort     Completed:   Bike  Glute bridge with band  Sidelying AB with ER 3#   Sidelying Adduction no resistance   Ball squeezes 10x6s  Pain relief TENs with heat on sacrum       8/23/24  Ath comes in c/o right sacrum pain. She states she tripped over another player's foot during practice and felt her right buttock tighten up. She states it feels like a \"pinch\" sensation. Applying pressure to the area relieves her pain.  Ath completed: piriformis stretching, side lying AB with ER, glute bridges    She will f/u prn.   "

## 2024-09-18 ENCOUNTER — ATHLETIC TRAINING (OUTPATIENT)
Dept: SPORTS MEDICINE | Facility: OTHER | Age: 19
End: 2024-09-18

## 2024-09-18 DIAGNOSIS — S93.621A LISFRANC'S SPRAIN, RIGHT, INITIAL ENCOUNTER: Primary | ICD-10-CM

## 2024-09-19 NOTE — PROGRESS NOTES
9/18:  Pt is in for open clinic because they are experiencing pain in their foot again. I completed joint mobs to try and reduce pain. I also recommended she replace her insert in her shoe.  CM    8/17  Ath comes to [facility] after practice. She is frustrated that her foot is bothering her already. AT explained that it is a lot on her body after not doing this much over summer and that she is now working out on a different surface than she was at home. She is also frustrated with a recent health condition she was diagnosed with and feels she is not physically where she used to be. Her health condition was discussed with AT and ath felt better afterwards. She will keep AT updated.      Treatment:   Thermx 20min

## 2024-10-09 ENCOUNTER — ATHLETIC TRAINING (OUTPATIENT)
Dept: SPORTS MEDICINE | Facility: OTHER | Age: 19
End: 2024-10-09

## 2024-10-09 DIAGNOSIS — S93.621A LISFRANC'S SPRAIN, RIGHT, INITIAL ENCOUNTER: Primary | ICD-10-CM

## 2024-10-10 NOTE — PROGRESS NOTES
10/9:  Pt requested instrument assisted soft tissue work on the bottom her foot during open clinic.  CM    9/18:  Pt is in for open clinic because they are experiencing pain in their foot again. I completed joint mobs to try and reduce pain. I also recommended she replace her insert in her shoe.  CM     8/17  Ath comes to [facility] after practice. She is frustrated that her foot is bothering her already. AT explained that it is a lot on her body after not doing this much over summer and that she is now working out on a different surface than she was at home. She is also frustrated with a recent health condition she was diagnosed with and feels she is not physically where she used to be. Her health condition was discussed with AT and ath felt better afterwards. She will keep AT updated.      Treatment:   Thermx 20min

## 2024-10-23 ENCOUNTER — ATHLETIC TRAINING (OUTPATIENT)
Dept: SPORTS MEDICINE | Facility: OTHER | Age: 19
End: 2024-10-23

## 2024-10-23 DIAGNOSIS — S93.621A LISFRANC'S SPRAIN, RIGHT, INITIAL ENCOUNTER: Primary | ICD-10-CM

## 2024-10-25 NOTE — PROGRESS NOTES
10/23:  Pt comes to open clinic stating that their foot is always in pain but the day after a game her foot hurts even worse. She completed IASTM on plantar fascia to reduce pain and tension.  CM    10/9:  Pt requested instrument assisted soft tissue work on the bottom her foot during open clinic.  CM     9/18:  Pt is in for open clinic because they are experiencing pain in their foot again. I completed joint mobs to try and reduce pain. I also recommended she replace her insert in her shoe.  CM     8/17  Ath comes to [facility] after practice. She is frustrated that her foot is bothering her already. AT explained that it is a lot on her body after not doing this much over summer and that she is now working out on a different surface than she was at home. She is also frustrated with a recent health condition she was diagnosed with and feels she is not physically where she used to be. Her health condition was discussed with AT and ath felt better afterwards. She will keep AT updated.      Treatment:   Thermx 20min

## 2025-02-17 ENCOUNTER — ATHLETIC TRAINING (OUTPATIENT)
Dept: SPORTS MEDICINE | Facility: OTHER | Age: 20
End: 2025-02-17

## 2025-02-17 DIAGNOSIS — S76.112A QUADRICEPS STRAIN, LEFT, INITIAL ENCOUNTER: Primary | ICD-10-CM

## 2025-02-18 NOTE — PROGRESS NOTES
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 She states that she probably had hip issues before during soccer but does not recall anything specific at the moment. She initially is receiving treatment/rehab for her MCL on the right leg. CC today is left quad pain. The pain started at Saturday (02/15/25) practice and she iced after. Throughout the weekend she did not experience pain. It started hurting her again during the first drill at practice today. She reports constant upward shooting pain while participating in lacrosse. She reports pulling pain when she flexes her knee and shooting pain while WB after practice. During her ADLs walking is painful but the stairs are worse due to hip flexion.    O: Femoral retroversion is noted with tibia and feet fixed in correct positioning. TTP over vastus medialis  Special test: Vaibhav (+), Sherwin (+)  MMT: Knee extension 4/5  Knee flexion  From 180: (R) 2/5 full ROM pain & little resistance  (L) 4/5 pain at end of ROM  From 90: (R) 4/5 pain & weakness from knee  (L) 4/5 pulling pain in anterior portion  Hip flexion 3/5  Hip extension 5/5    A: Compensation and tightness on hip flexors & quadricep muscles bilaterally.    P: Pt was advised to continue knee rehabilitation and come to the clinic before practice to address hip/quad tightness. Advised to activate/warm-up muscle group on the bike or with a heating pad before going into light stretching.    MC, ATS  BD, ATC

## 2025-02-20 ENCOUNTER — ATHLETIC TRAINING (OUTPATIENT)
Dept: SPORTS MEDICINE | Facility: OTHER | Age: 20
End: 2025-02-20

## 2025-02-20 DIAGNOSIS — S76.112A QUADRICEPS STRAIN, LEFT, INITIAL ENCOUNTER: Primary | ICD-10-CM

## 2025-02-20 NOTE — PROGRESS NOTES
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 She states that she probably had hip issues before during soccer but does not recall anything specific at the moment. She initially is receiving treatment/rehab for her MCL on the right leg. CC today is left quad pain. The pain started at Saturday (02/15/25) practice and she iced after. Throughout the weekend she did not experience pain. It started hurting her again during the first drill at practice today. She reports constant upward shooting pain while participating in lacrosse. She reports pulling pain when she flexes her knee and shooting pain while WB after practice. During her ADLs walking is painful but the stairs are worse due to hip flexion.     O: Femoral retroversion is noted with tibia and feet fixed in correct positioning. TTP over vastus medialis  Special test: Vaibhav (+), Sherwin (+)  MMT: Knee extension 4/5  Knee flexion  From 180: (R) 2/5 full ROM pain & little resistance  (L) 4/5 pain at end of ROM  From 90: (R) 4/5 pain & weakness from knee  (L) 4/5 pulling pain in anterior portion  Hip flexion 3/5  Hip extension 5/5     A: Compensation and tightness on hip flexors & quadricep muscles bilaterally.     P: Pt was advised to continue knee rehabilitation and come to the clinic before practice to address hip/quad tightness. Advised to activate/warm-up muscle group on the bike or with a heating pad before going into light stretching.     MC, ATS  BD, ATC        2/20:  Today I discussed a plan of care for Joceline moving forward. She comes in the day after her game feeling sore and that her thigh is \"feeling like it is ripping\". She played her whole game yesterday using a hip wrap and analgesic cream. She will not complete practice until early next week to participate in her game on Wednesday. I diagnosed her a quad strain and limited her activity. She will start light strengthening " exercises over the weekend.  We completed electrical stimulation of her left quad for 20 mins.  SHELIA

## 2025-02-21 ENCOUNTER — ATHLETIC TRAINING (OUTPATIENT)
Dept: SPORTS MEDICINE | Facility: OTHER | Age: 20
End: 2025-02-21

## 2025-02-21 DIAGNOSIS — S76.112D QUADRICEPS STRAIN, LEFT, SUBSEQUENT ENCOUNTER: Primary | ICD-10-CM

## 2025-02-21 NOTE — PROGRESS NOTES
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 She states that she probably had hip issues before during soccer but does not recall anything specific at the moment. She initially is receiving treatment/rehab for her MCL on the right leg. CC today is left quad pain. The pain started at Saturday (02/15/25) practice and she iced after. Throughout the weekend she did not experience pain. It started hurting her again during the first drill at practice today. She reports constant upward shooting pain while participating in lacrosse. She reports pulling pain when she flexes her knee and shooting pain while WB after practice. During her ADLs walking is painful but the stairs are worse due to hip flexion.     O: Femoral retroversion is noted with tibia and feet fixed in correct positioning. TTP over vastus medialis  Special test: Vaibhav (+), Sherwin (+)  MMT: Knee extension 4/5  Knee flexion  From 180: (R) 2/5 full ROM pain & little resistance  (L) 4/5 pain at end of ROM  From 90: (R) 4/5 pain & weakness from knee  (L) 4/5 pulling pain in anterior portion  Hip flexion 3/5  Hip extension 5/5     A: Compensation and tightness on hip flexors & quadricep muscles bilaterally.     P: Pt was advised to continue knee rehabilitation and come to the clinic before practice to address hip/quad tightness. Advised to activate/warm-up muscle group on the bike or with a heating pad before going into light stretching.     MC, ATS  BD, ATC        2/20:  Today I discussed a plan of care for Joceline moving forward. She comes in the day after her game feeling sore and that her thigh is \"feeling like it is ripping\". She played her whole game yesterday using a hip wrap and analgesic cream. She will not complete practice until early next week to participate in her game on Wednesday. I diagnosed her a quad strain and limited her activity. She will start light strengthening " exercises over the weekend.  We completed electrical stimulation of her left quad for 20 mins.  CM    2/21  Almita comes in today for continued treatment of left hip. She no longer has pain with walking around or stairs, but more so just feels a stretch.   Completed:   - Quad set with SLR (only about 5deg lift) 2x10  - Abduction SLR 2x10  - Hamstring curls red TB 3x10  - CUS     She will not practice today or tomorrow.

## 2025-02-24 ENCOUNTER — ATHLETIC TRAINING (OUTPATIENT)
Dept: SPORTS MEDICINE | Facility: OTHER | Age: 20
End: 2025-02-24

## 2025-02-24 DIAGNOSIS — S76.112D QUADRICEPS STRAIN, LEFT, SUBSEQUENT ENCOUNTER: Primary | ICD-10-CM

## 2025-02-24 NOTE — PROGRESS NOTES
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 She states that she probably had hip issues before during soccer but does not recall anything specific at the moment. She initially is receiving treatment/rehab for her MCL on the right leg. CC today is left quad pain. The pain started at Saturday (02/15/25) practice and she iced after. Throughout the weekend she did not experience pain. It started hurting her again during the first drill at practice today. She reports constant upward shooting pain while participating in lacrosse. She reports pulling pain when she flexes her knee and shooting pain while WB after practice. During her ADLs walking is painful but the stairs are worse due to hip flexion.     O: Femoral retroversion is noted with tibia and feet fixed in correct positioning. TTP over vastus medialis  Special test: Vaibhav (+), Sherwin (+)  MMT: Knee extension 4/5  Knee flexion  From 180: (R) 2/5 full ROM pain & little resistance  (L) 4/5 pain at end of ROM  From 90: (R) 4/5 pain & weakness from knee  (L) 4/5 pulling pain in anterior portion  Hip flexion 3/5  Hip extension 5/5     A: Compensation and tightness on hip flexors & quadricep muscles bilaterally.     P: Pt was advised to continue knee rehabilitation and come to the clinic before practice to address hip/quad tightness. Advised to activate/warm-up muscle group on the bike or with a heating pad before going into light stretching.     MC, ATS  BD, ATC         2/20:  Today I discussed a plan of care for Joceline moving forward. She comes in the day after her game feeling sore and that her thigh is \"feeling like it is ripping\". She played her whole game yesterday using a hip wrap and analgesic cream. She will not complete practice until early next week to participate in her game on Wednesday. I diagnosed her a quad strain and limited her activity. She will start light strengthening " exercises over the weekend.  We completed electrical stimulation of her left quad for 20 mins.  SHELIA     2/21  Almita comes in today for continued treatment of left hip. She no longer has pain with walking around or stairs, but more so just feels a stretch.   Completed:   - Quad set with SLR (only about 5deg lift) 2x10  - Abduction SLR 2x10  - Hamstring curls red TB 3x10  - CUS      She will not practice today or tomorrow.      2/24:  Almita states they her knee hurts and is more stiff today compared to usual. Might do some side line return to play with AT down at field. Today her knee hurts when walking and bending all the time.   She completed exercises for both her quad strain and her knee injury.   She completed a hip spica for practice.  SHELIA

## 2025-02-24 NOTE — PROGRESS NOTES
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 She states that she probably had hip issues before during soccer but does not recall anything specific at the moment. She initially is receiving treatment/rehab for her MCL on the right leg. CC today is left quad pain. The pain started at Saturday (02/15/25) practice and she iced after. Throughout the weekend she did not experience pain. It started hurting her again during the first drill at practice today. She reports constant upward shooting pain while participating in lacrosse. She reports pulling pain when she flexes her knee and shooting pain while WB after practice. During her ADLs walking is painful but the stairs are worse due to hip flexion.     O: Femoral retroversion is noted with tibia and feet fixed in correct positioning. TTP over vastus medialis  Special test: Vaibhav (+), Sherwin (+)  MMT: Knee extension 4/5  Knee flexion  From 180: (R) 2/5 full ROM pain & little resistance  (L) 4/5 pain at end of ROM  From 90: (R) 4/5 pain & weakness from knee  (L) 4/5 pulling pain in anterior portion  Hip flexion 3/5  Hip extension 5/5     A: Compensation and tightness on hip flexors & quadricep muscles bilaterally.     P: Pt was advised to continue knee rehabilitation and come to the clinic before practice to address hip/quad tightness. Advised to activate/warm-up muscle group on the bike or with a heating pad before going into light stretching.     MC, ATS  BD, ATC        2/20:  Today I discussed a plan of care for Joceline moving forward. She comes in the day after her game feeling sore and that her thigh is \"feeling like it is ripping\". She played her whole game yesterday using a hip wrap and analgesic cream. She will not complete practice until early next week to participate in her game on Wednesday. I diagnosed her a quad strain and limited her activity. She will start light strengthening " "exercises over the weekend.  We completed electrical stimulation of her left quad for 20 mins.  CM    2/21  Almita comes in today for continued treatment of left hip. She no longer has pain with walking around or stairs, but more so just feels a stretch.   Completed:   - Quad set with SLR (only about 5deg lift) 2x10  - Abduction SLR 2x10  - Hamstring curls red TB 3x10  - CUS     She will not practice today or tomorrow.     2/24  Joceline states her quad feels \"fine\" but she is unsure because she hasn't tried running. She states her knee hurts a lot worse and feels more stiff.   Completed:  - Bike 6min   - Quad set with SLR (about 30deg lift) 3x10   - Hamstring curls prone red TB 3x10 (became easy towards end)    - Bosu squats 2x10  - Lunge (discomfort with left leg in back), 2x10 left in front   Ath completed slow jogs, medium jogs, shuffling, ladder drills, was able to get up to 70% of sprint, and was able to do slow drills at practice today. A hip spica was used.       "

## 2025-02-25 ENCOUNTER — ATHLETIC TRAINING (OUTPATIENT)
Dept: SPORTS MEDICINE | Facility: OTHER | Age: 20
End: 2025-02-25

## 2025-02-25 DIAGNOSIS — S76.112D QUADRICEPS STRAIN, LEFT, SUBSEQUENT ENCOUNTER: Primary | ICD-10-CM

## 2025-02-25 NOTE — PROGRESS NOTES
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 She states that she probably had hip issues before during soccer but does not recall anything specific at the moment. She initially is receiving treatment/rehab for her MCL on the right leg. CC today is left quad pain. The pain started at Saturday (02/15/25) practice and she iced after. Throughout the weekend she did not experience pain. It started hurting her again during the first drill at practice today. She reports constant upward shooting pain while participating in lacrosse. She reports pulling pain when she flexes her knee and shooting pain while WB after practice. During her ADLs walking is painful but the stairs are worse due to hip flexion.     O: Femoral retroversion is noted with tibia and feet fixed in correct positioning. TTP over vastus medialis  Special test: Vaibhav (+), Sherwin (+)  MMT: Knee extension 4/5  Knee flexion  From 180: (R) 2/5 full ROM pain & little resistance  (L) 4/5 pain at end of ROM  From 90: (R) 4/5 pain & weakness from knee  (L) 4/5 pulling pain in anterior portion  Hip flexion 3/5  Hip extension 5/5     A: Compensation and tightness on hip flexors & quadricep muscles bilaterally.     P: Pt was advised to continue knee rehabilitation and come to the clinic before practice to address hip/quad tightness. Advised to activate/warm-up muscle group on the bike or with a heating pad before going into light stretching.     MC, ATS  BD, ATC        2/20:  Today I discussed a plan of care for Joceline moving forward. She comes in the day after her game feeling sore and that her thigh is \"feeling like it is ripping\". She played her whole game yesterday using a hip wrap and analgesic cream. She will not complete practice until early next week to participate in her game on Wednesday. I diagnosed her a quad strain and limited her activity. She will start light strengthening " "exercises over the weekend.  We completed electrical stimulation of her left quad for 20 mins.  CM    2/21  Almita comes in today for continued treatment of left hip. She no longer has pain with walking around or stairs, but more so just feels a stretch.   Completed:   - Quad set with SLR (only about 5deg lift) 2x10  - Abduction SLR 2x10  - Hamstring curls red TB 3x10  - CUS     She will not practice today or tomorrow.     2/24  Joceline states her quad feels \"fine\" but she is unsure because she hasn't tried running. She states her knee hurts a lot worse and feels more stiff.   Completed:  - Bike 6min   - Quad set with SLR (about 30deg lift) 3x10   - Hamstring curls prone red TB 3x10 (became easy towards end)    - Bosu squats 2x10  - Lunge (discomfort with left leg in back), 2x10 left in front   Ath completed slow jogs, medium jogs, shuffling, ladder drills, was able to get up to 70% of sprint, and was able to do slow drills at practice today. A hip spica was used.     2/25  Joceline states her left quad is feeling much better. She states it moreso feels tight than discomfort.     Completed:  - Bike 10min  - IASTM left quad   - Hip flexor and quad stretching   - SLR 3x10 (full ROM compared B/L)    Will complete warmup and 7v7 today at practice with hip spica to see how that feels. The plan is to play in game tomorrow but limited time depending on how she feels.      "

## 2025-02-27 ENCOUNTER — ATHLETIC TRAINING (OUTPATIENT)
Dept: SPORTS MEDICINE | Facility: OTHER | Age: 20
End: 2025-02-27

## 2025-02-27 DIAGNOSIS — S76.112D QUADRICEPS STRAIN, LEFT, SUBSEQUENT ENCOUNTER: Primary | ICD-10-CM

## 2025-02-27 NOTE — PROGRESS NOTES
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 She states that she probably had hip issues before during soccer but does not recall anything specific at the moment. She initially is receiving treatment/rehab for her MCL on the right leg. CC today is left quad pain. The pain started at Saturday (02/15/25) practice and she iced after. Throughout the weekend she did not experience pain. It started hurting her again during the first drill at practice today. She reports constant upward shooting pain while participating in lacrosse. She reports pulling pain when she flexes her knee and shooting pain while WB after practice. During her ADLs walking is painful but the stairs are worse due to hip flexion.     O: Femoral retroversion is noted with tibia and feet fixed in correct positioning. TTP over vastus medialis  Special test: Vaibhav (+), Sherwin (+)  MMT: Knee extension 4/5  Knee flexion  From 180: (R) 2/5 full ROM pain & little resistance  (L) 4/5 pain at end of ROM  From 90: (R) 4/5 pain & weakness from knee  (L) 4/5 pulling pain in anterior portion  Hip flexion 3/5  Hip extension 5/5     A: Compensation and tightness on hip flexors & quadricep muscles bilaterally.     P: Pt was advised to continue knee rehabilitation and come to the clinic before practice to address hip/quad tightness. Advised to activate/warm-up muscle group on the bike or with a heating pad before going into light stretching.     MC, ATS  BD, ATC        2/20:  Today I discussed a plan of care for Joceline moving forward. She comes in the day after her game feeling sore and that her thigh is \"feeling like it is ripping\". She played her whole game yesterday using a hip wrap and analgesic cream. She will not complete practice until early next week to participate in her game on Wednesday. I diagnosed her a quad strain and limited her activity. She will start light strengthening " "exercises over the weekend.  We completed electrical stimulation of her left quad for 20 mins.  CM    2/21  Almita comes in today for continued treatment of left hip. She no longer has pain with walking around or stairs, but more so just feels a stretch.   Completed:   - Quad set with SLR (only about 5deg lift) 2x10  - Abduction SLR 2x10  - Hamstring curls red TB 3x10  - CUS     She will not practice today or tomorrow.     2/24  Joceline states her quad feels \"fine\" but she is unsure because she hasn't tried running. She states her knee hurts a lot worse and feels more stiff.   Completed:  - Bike 6min   - Quad set with SLR (about 30deg lift) 3x10   - Hamstring curls prone red TB 3x10 (became easy towards end)    - Bosu squats 2x10  - Lunge (discomfort with left leg in back), 2x10 left in front   Ath completed slow jogs, medium jogs, shuffling, ladder drills, was able to get up to 70% of sprint, and was able to do slow drills at practice today. A hip spica was used.     2/25  Joceline states her left quad is feeling much better. She states it moreso feels tight than discomfort.     Completed:  - Bike 10min  - IASTM left quad   - Hip flexor and quad stretching   - SLR 3x10 (full ROM compared B/L)    Will complete warmup and 7v7 today at practice with hip spica to see how that feels. The plan is to play in game tomorrow but limited time depending on how she feels.        2/27  Luis F played in entire game yesterday and states her quad hurts today. She states it's not as bad as it was but she can feel it. Towards the end of the game is when she mostly felt it. She states she only feels it during full sprints, but playing defense was fine. She prefers the MCL tape over a knee sleeve.     Completed: Stim and heat over quad. She will take a rest day today.      "

## 2025-02-28 ENCOUNTER — ATHLETIC TRAINING (OUTPATIENT)
Dept: SPORTS MEDICINE | Facility: OTHER | Age: 20
End: 2025-02-28

## 2025-02-28 DIAGNOSIS — S76.112D QUADRICEPS STRAIN, LEFT, SUBSEQUENT ENCOUNTER: Primary | ICD-10-CM

## 2025-02-28 NOTE — PROGRESS NOTES
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 She states that she probably had hip issues before during soccer but does not recall anything specific at the moment. She initially is receiving treatment/rehab for her MCL on the right leg. CC today is left quad pain. The pain started at Saturday (02/15/25) practice and she iced after. Throughout the weekend she did not experience pain. It started hurting her again during the first drill at practice today. She reports constant upward shooting pain while participating in lacrosse. She reports pulling pain when she flexes her knee and shooting pain while WB after practice. During her ADLs walking is painful but the stairs are worse due to hip flexion.     O: Femoral retroversion is noted with tibia and feet fixed in correct positioning. TTP over vastus medialis  Special test: Vaibhav (+), Sherwin (+)  MMT: Knee extension 4/5  Knee flexion  From 180: (R) 2/5 full ROM pain & little resistance  (L) 4/5 pain at end of ROM  From 90: (R) 4/5 pain & weakness from knee  (L) 4/5 pulling pain in anterior portion  Hip flexion 3/5  Hip extension 5/5     A: Compensation and tightness on hip flexors & quadricep muscles bilaterally.     P: Pt was advised to continue knee rehabilitation and come to the clinic before practice to address hip/quad tightness. Advised to activate/warm-up muscle group on the bike or with a heating pad before going into light stretching.     MC, ATS  BD, ATC        2/20:  Today I discussed a plan of care for Joceline moving forward. She comes in the day after her game feeling sore and that her thigh is \"feeling like it is ripping\". She played her whole game yesterday using a hip wrap and analgesic cream. She will not complete practice until early next week to participate in her game on Wednesday. I diagnosed her a quad strain and limited her activity. She will start light strengthening " "exercises over the weekend.  We completed electrical stimulation of her left quad for 20 mins.  CM    2/21  Almita comes in today for continued treatment of left hip. She no longer has pain with walking around or stairs, but more so just feels a stretch.   Completed:   - Quad set with SLR (only about 5deg lift) 2x10  - Abduction SLR 2x10  - Hamstring curls red TB 3x10  - CUS     She will not practice today or tomorrow.     2/24  Joceline states her quad feels \"fine\" but she is unsure because she hasn't tried running. She states her knee hurts a lot worse and feels more stiff.   Completed:  - Bike 6min   - Quad set with SLR (about 30deg lift) 3x10   - Hamstring curls prone red TB 3x10 (became easy towards end)    - Bosu squats 2x10  - Lunge (discomfort with left leg in back), 2x10 left in front   Ath completed slow jogs, medium jogs, shuffling, ladder drills, was able to get up to 70% of sprint, and was able to do slow drills at practice today. A hip spica was used.     2/25  Joceline states her left quad is feeling much better. She states it moreso feels tight than discomfort.     Completed:  - Bike 10min  - IASTM left quad   - Hip flexor and quad stretching   - SLR 3x10 (full ROM compared B/L)    Will complete warmup and 7v7 today at practice with hip spica to see how that feels. The plan is to play in game tomorrow but limited time depending on how she feels.        2/27  Ath played in entire game yesterday and states her quad hurts today. She states it's not as bad as it was but she can feel it. Towards the end of the game is when she mostly felt it. She states she only feels it during full sprints, but playing defense was fine. She prefers the MCL tape over a knee sleeve.     Completed: Stim and heat over quad. She will take a rest day today.    2/28  Ath states her quad is sore today, but not painful. She does not have any pain with walking or going up/down stairs.   Completed  - Bike 7min  - IASTM 3min  - SLR 3x10 " 1.5#   - Seated LAQ red TB 3x10    Will practice as tolerated today and play in game tomorrow.

## 2025-03-10 ENCOUNTER — ATHLETIC TRAINING (OUTPATIENT)
Dept: SPORTS MEDICINE | Facility: OTHER | Age: 20
End: 2025-03-10

## 2025-03-10 DIAGNOSIS — M25.561 RIGHT KNEE PAIN, UNSPECIFIED CHRONICITY: Primary | ICD-10-CM

## 2025-03-10 NOTE — PROGRESS NOTES
Athletic Training Progress Note    Name: Joceline Mueller  Age: 19 y.o.     Assessment/Plan:     Visit Diagnosis: Left knee pain    Treatment Plan:   []  Follow-up PRN.   []  Follow-up prior to next practice/game for re-evaluation.  [x]  Daily treatment/rehab. Progress note expected weekly.     Subjective: Ath comes in for continued treatment of her right knee. She has been focusing on treatment of her left quad strain but that has since gone away and is healed. She reports sharp shooting pain in her right knee during activity and describes it being all medial anteriorly and posteriorly. She also states different positions she puts herself in (standing/sitting awkwardly) elicits pain as well. She has been with getting up on her bed with a bent leg and when trying to sit on her heels. She denies pain with long periods of walking and denies lingering pain. When this pain occurs, it is sharp and then goes away after about one minute.     Objective:   No TTP  Varus (-)  Thessalys (-)    Treatment Log:  Date: 3/10/24   Playing Status: As tolerated         Exercise/Treatment     Bike 5min   SLR (no adduction) 2x10  2#   Heel taps 2x15   Lateral side steps (medium box) 2x10   Slide board  2x1 min

## 2025-03-11 ENCOUNTER — ATHLETIC TRAINING (OUTPATIENT)
Dept: SPORTS MEDICINE | Facility: OTHER | Age: 20
End: 2025-03-11

## 2025-03-11 DIAGNOSIS — M25.561 RIGHT KNEE PAIN, UNSPECIFIED CHRONICITY: Primary | ICD-10-CM

## 2025-03-11 NOTE — PROGRESS NOTES
Athletic Training Progress Note    Name: Joceline Mueller  Age: 19 y.o.     Assessment/Plan:     Visit Diagnosis: Left knee pain    Treatment Plan:   []  Follow-up PRN.   []  Follow-up prior to next practice/game for re-evaluation.  [x]  Daily treatment/rehab. Progress note expected weekly.     Subjective: Ath comes in for continued treatment of her right knee. She has been focusing on treatment of her left quad strain but that has since gone away and is healed. She reports sharp shooting pain in her right knee during activity and describes it being all medial anteriorly and posteriorly. She also states different positions she puts herself in (standing/sitting awkwardly) elicits pain as well. She has been with getting up on her bed with a bent leg and when trying to sit on her heels. She denies pain with long periods of walking and denies lingering pain. When this pain occurs, it is sharp and then goes away after about one minute.     Objective:   No TTP  Varus (-)  Thessalys (-)    Treatment Log:  Date: 3/11/25 3/10/25   Playing Status: As tolerated  As tolerated          Exercise/Treatment      Bike  5min   Wall sit holds 4x30s    Clam shells 2x15 blue TB    Gliding cupping 3min    SLR (no adduction)  2x10  2#   Heel taps  2x15   Lateral side steps (medium box)  2x10   Slide board   2x1 min

## 2025-03-13 ENCOUNTER — ATHLETIC TRAINING (OUTPATIENT)
Dept: SPORTS MEDICINE | Facility: OTHER | Age: 20
End: 2025-03-13

## 2025-03-13 DIAGNOSIS — M25.561 RIGHT KNEE PAIN, UNSPECIFIED CHRONICITY: Primary | ICD-10-CM

## 2025-03-13 NOTE — PROGRESS NOTES
Athletic Training Progress Note    Name: Joceline Mueller  Age: 19 y.o.     Assessment/Plan:     Visit Diagnosis: Left knee pain    Treatment Plan:   []  Follow-up PRN.   []  Follow-up prior to next practice/game for re-evaluation.  [x]  Daily treatment/rehab. Progress note expected weekly.     Subjective: Ath comes in for continued treatment of her right knee. She has been focusing on treatment of her left quad strain but that has since gone away and is healed. She reports sharp shooting pain in her right knee during activity and describes it being all medial anteriorly and posteriorly. She also states different positions she puts herself in (standing/sitting awkwardly) elicits pain as well. She has been with getting up on her bed with a bent leg and when trying to sit on her heels. She denies pain with long periods of walking and denies lingering pain. When this pain occurs, it is sharp and then goes away after about one minute.     Objective:   No TTP  Varus (-)  Thessalys (-)    Treatment Log:  Date: 3/13/25 3/11/25 3/10/25   Playing Status: As tolerated As tolerated  As tolerated           Exercise/Treatment       Bike   5min   CUS Completed 7min     Wall sit holds  4x30s    Clam shells  2x15 blue TB    Gliding cupping  3min    Hamstring curls 3x10 red TB     SLR (no adduction) 3x10 4#  2x10  2#   Lateral side steps 4x red TB     Heel taps 3x10  2x15   Lateral side steps (medium box)   2x10   Slide board    2x1 min